# Patient Record
Sex: MALE | Race: WHITE | URBAN - NONMETROPOLITAN AREA
[De-identification: names, ages, dates, MRNs, and addresses within clinical notes are randomized per-mention and may not be internally consistent; named-entity substitution may affect disease eponyms.]

---

## 2018-08-22 ENCOUNTER — HOSPITAL ENCOUNTER (OUTPATIENT)
Dept: WOUND CARE | Age: 48
Discharge: HOME | End: 2018-08-22
Attending: NURSE PRACTITIONER | Admitting: NURSE PRACTITIONER

## 2018-08-22 VITALS
SYSTOLIC BLOOD PRESSURE: 131 MMHG | RESPIRATION RATE: 20 BRPM | DIASTOLIC BLOOD PRESSURE: 91 MMHG | TEMPERATURE: 97.2 F | HEART RATE: 98 BPM

## 2018-08-22 DIAGNOSIS — L97.519 ULCER OF TOE OF RIGHT FOOT, WITH UNSPECIFIED SEVERITY (HCC): ICD-10-CM

## 2018-08-22 DIAGNOSIS — I73.9 PERIPHERAL ARTERIAL DISEASE (HCC): ICD-10-CM

## 2018-08-22 DIAGNOSIS — L97.519: ICD-10-CM

## 2018-08-22 PROCEDURE — 99203 OFFICE O/P NEW LOW 30 MIN: CPT | Performed by: NURSE PRACTITIONER

## 2018-08-22 RX ORDER — CLINDAMYCIN HYDROCHLORIDE 150 MG/1
150 CAPSULE ORAL
COMMUNITY

## 2018-08-22 RX ORDER — HYDROCODONE BITARTRATE AND ACETAMINOPHEN 5; 325 MG/1; MG/1
1 TABLET ORAL
COMMUNITY
Start: 2018-07-25

## 2018-08-22 RX ORDER — IBUPROFEN 800 MG/1
1600 TABLET ORAL
COMMUNITY

## 2018-08-22 RX ORDER — CIPROFLOXACIN 500 MG/1
500 TABLET, FILM COATED ORAL
COMMUNITY

## 2018-08-22 RX ORDER — AMLODIPINE BESYLATE 5 MG/1
5 TABLET ORAL
COMMUNITY

## 2018-08-22 RX ORDER — HYDROCODONE BITARTRATE AND ACETAMINOPHEN 7.5; 325 MG/1; MG/1
1 TABLET ORAL EVERY 6 HOURS PRN
Qty: 20 TABLET | Refills: 0 | Status: SHIPPED | OUTPATIENT
Start: 2018-08-22 | End: 2018-08-22 | Stop reason: SDUPTHER

## 2018-08-22 RX ORDER — ATORVASTATIN CALCIUM 40 MG/1
40 TABLET, FILM COATED ORAL
COMMUNITY
Start: 2018-08-07 | End: 2019-08-07

## 2018-08-22 ASSESSMENT — PAIN SCALES - GENERAL: PAINLEVEL_OUTOF10: 8

## 2018-08-22 ASSESSMENT — PAIN DESCRIPTION - ONSET: ONSET: ON-GOING

## 2018-08-22 ASSESSMENT — PAIN DESCRIPTION - FREQUENCY: FREQUENCY: CONTINUOUS

## 2018-08-22 ASSESSMENT — PAIN DESCRIPTION - PROGRESSION: CLINICAL_PROGRESSION: GRADUALLY WORSENING

## 2018-08-22 ASSESSMENT — PAIN DESCRIPTION - ORIENTATION: ORIENTATION: RIGHT

## 2018-08-22 ASSESSMENT — PAIN DESCRIPTION - LOCATION: LOCATION: FOOT;TOE (COMMENT WHICH ONE)

## 2018-08-22 ASSESSMENT — PAIN DESCRIPTION - PAIN TYPE: TYPE: ACUTE PAIN

## 2018-08-22 ASSESSMENT — PAIN DESCRIPTION - DESCRIPTORS: DESCRIPTORS: BURNING;SHOOTING;STABBING

## 2018-08-22 NOTE — PROGRESS NOTES
Melba from [id number] called and states patient filled a script for Pilot this morning. Patient presents with another script for Norco this afternoon and she is not able to fill. Elisha Parada NP notified. Elisha Parada instructed for Melba to discard her norco script since patient will be seeing Dr [place] on Monday at wound clinic. Melba notified and script to be discarded. Melba to explain to patient that he can not fill both scripts due to insurance and law regulations.  Electronically signed by Iman Sexton RN on 8/22/2018 at 12:58 PM

## 2018-08-22 NOTE — PROGRESS NOTES
Hyperbaric oxygen history and physical        Bobby Hobbs  AGE: 57 y.o. GENDER: male  DOD: 1970  TODAY'S DATE:  8/22/2018    Subjective:      Bobby Hobbs is a 57 y.o. 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 The condition is of marked severity and is considered non healing and chronic. The wound has been present for 10 weeks. The inciting event was acute ischemic blockage. The patient is also receiving periodic debridement and dressing changes at [address] [place] [address]. He had revascularization procedure at [facility] 8/7/18. He continues to have necrotic tissue to the majority of right 4th and 5th toes. He continues on clindamycin and cipro oral antibiotics. He continues to have severe pain in the toes. HBO is being utilized for limb salvage at this point. The patient has significant underlying medical conditions as listed below in [address] [place] [address] Road    Past Medical History:    History reviewed. No pertinent past medical history. Medical History Pertinent to HBO:   Patient denies a history of amiodarone use or Bleomycin use with the last 12 months. No current chemotherapy use. No history of chronic sinusitis or upper respiratory infections, no reconstructive ear surgery or significant auditory impairment. No history of recent or uncontrolled seizure disorder. No history of significant cognitive impairment or psychiatric illnesses. No history of severe COPD with CO2 retention or bullous emphysema. No history of recent chest surgery. No history of pneumothorax. No history of pacemaker or AICD. No history of sudden CHF. No spherocytoses, sickle cell disease, or other hemoglobinopathy known. No history of optic neuritis or significant visual impairment. Past Surgical History:    History reviewed. No pertinent surgical history.       Medications Prior to Admission:    Prior to Admission medications Medication Sig Start Date End Date Taking? Authorizing Provider   atorvastatin (LIPITOR) 40 MG tablet Take 40 mg by mouth 8/7/18 8/7/19 Yes Historical Provider, MD   HYDROcodone-acetaminophen (NORCO) 5-325 MG per tablet Take 1 tablet by mouth. . 7/25/18  Yes Historical Provider, MD   HYDROcodone-acetaminophen (NORCO) 7.5-325 MG per tablet Take 1 tablet by mouth every 6 hours as needed for Pain for up to 5 days. Intended supply: 5 days. Take lowest dose possible to manage pain. 8/22/18 8/27/18 Yes DORIS De La Garza - CNP   amLODIPine (NORVASC) 5 MG tablet Take 5 mg by mouth    Historical Provider, MD   aspirin 81 MG tablet Take 81 mg by mouth    Historical Provider, MD   vitamin D (CHOLECALCIFEROL) 1000 units TABS tablet Take 1,000 Units by mouth    Historical Provider, MD   ciprofloxacin (CIPRO) 500 MG tablet Take 500 mg by mouth    Historical Provider, MD   clindamycin (CLEOCIN) 150 MG capsule Take 150 mg by mouth    Historical Provider, MD   ibuprofen (ADVIL;MOTRIN) 800 MG tablet Take 1,600 mg by mouth    Historical Provider, MD       Allergies:  Naproxen    Family history:  See information in Epic chart. Positive for none listed. Social History:   TOBACCO:   reports that he quit smoking about 2 weeks ago.  He has never used smokeless tobacco.  ETOH:   has no alcohol history on file.  home with a reliable caregiver  ROS:     REVIEW OF SYSTEMS    Constitutional: negative for chills, fatigue, fevers, malaise and sweats  Ears, nose, mouth, throat, and face: negative for ear drainage, earaches, facial trauma, hearing loss, hoarseness, nasal congestion, tinnitus and voice change  Respiratory: negative for chronic bronchitis, cough, dyspnea on exertion, emphysema and shortness of breath  Cardiovascular: negative for chest pain, chest pressure/discomfort, dyspnea, exertional chest pressure/discomfort and palpitations  Integument/breast: positive for skin lesion(s)  Neurological: negative for dizziness, headaches

## 2025-03-22 ENCOUNTER — HOSPITAL ENCOUNTER (EMERGENCY)
Age: 55
Discharge: HOME | End: 2025-03-22
Attending: EMERGENCY MEDICINE
Payer: OTHER GOVERNMENT

## 2025-03-22 ENCOUNTER — APPOINTMENT (OUTPATIENT)
Dept: ULTRASOUND IMAGING | Age: 55
End: 2025-03-22
Payer: OTHER GOVERNMENT

## 2025-03-22 VITALS
RESPIRATION RATE: 18 BRPM | OXYGEN SATURATION: 94 % | WEIGHT: 300 LBS | TEMPERATURE: 98.1 F | BODY MASS INDEX: 38.5 KG/M2 | HEART RATE: 86 BPM | DIASTOLIC BLOOD PRESSURE: 100 MMHG | SYSTOLIC BLOOD PRESSURE: 141 MMHG | HEIGHT: 74 IN

## 2025-03-22 DIAGNOSIS — N50.3 CYST OF EPIDIDYMIS: Primary | ICD-10-CM

## 2025-03-22 LAB
BILIRUB UR QL STRIP: NEGATIVE
CLARITY UR: CLEAR
COLOR UR: YELLOW
COMMENT: NORMAL
GLUCOSE UR STRIP-MCNC: NEGATIVE MG/DL
HGB UR QL STRIP.AUTO: NEGATIVE
KETONES UR STRIP-MCNC: NEGATIVE MG/DL
LEUKOCYTE ESTERASE UR QL STRIP: NEGATIVE
NITRITE UR QL STRIP: NEGATIVE
PH UR STRIP: 6 [PH] (ref 5–8)
PROT UR STRIP-MCNC: NEGATIVE MG/DL
SP GR UR STRIP: 1.02 (ref 1–1.03)
UROBILINOGEN UR STRIP-ACNC: 0.2 EU/DL (ref 0–1)

## 2025-03-22 PROCEDURE — 99284 EMERGENCY DEPT VISIT MOD MDM: CPT

## 2025-03-22 PROCEDURE — 76870 US EXAM SCROTUM: CPT

## 2025-03-22 PROCEDURE — 81003 URINALYSIS AUTO W/O SCOPE: CPT

## 2025-03-22 PROCEDURE — 93975 VASCULAR STUDY: CPT

## 2025-03-22 ASSESSMENT — PAIN DESCRIPTION - ORIENTATION: ORIENTATION: LEFT

## 2025-03-22 ASSESSMENT — PAIN - FUNCTIONAL ASSESSMENT: PAIN_FUNCTIONAL_ASSESSMENT: 0-10

## 2025-03-22 ASSESSMENT — PAIN SCALES - GENERAL: PAINLEVEL_OUTOF10: 2

## 2025-03-22 ASSESSMENT — PAIN DESCRIPTION - LOCATION: LOCATION: GROIN

## 2025-03-22 NOTE — ED PROVIDER NOTES
Triage Chief Complaint:   Groin Swelling (L sided sent from [facility] for US to R/O torsion )    [place]:  Bull Rothman is a 54 y.o. male that presents with concern for left testicular pain.  Patient was actually evaluated at outside hospital for new left testicle pain and swelling.  Patient was sent here for ultrasound to rule out torsion.  Patient does have history of the same.  Left testicle is with a new \"lump\" per the patient.  Patient describes it like a \"peanut shape\".  Patient does report isolated left-sided testicular pain that is mild.  No abdominal pain.  Patient has chronic back pain which is unchanged at this time.  Patient week ago had some pain with urinating but that since resolved.    ROS:  General:  No fevers, no chills  ENT: No sore throat, no runny nose  Cardiovascular:  No chest pain, no palpitations  Respiratory:  No shortness of breath, no cough  Gastrointestinal:  No pain, no nausea, no vomiting, no diarrhea  : No pain with urinating, no increased frequency urinating, + left testicle pain  Neurologic:  No numbness, no weakness  Extremities:  No edema, no pain  Skin:  No rash  Psych: No axienty    Past Medical History:   Diagnosis Date    Buerger disease 2018    Hyperlipidemia     Hypertension      Past Surgical History:   Procedure Laterality Date    FOOT SURGERY Left     cyst removal    KNEE SURGERY Left     ROTATOR CUFF REPAIR Bilateral     TOE AMPUTATION Right     4th and 5th     History reviewed. No pertinent family history.  Social History     Socioeconomic History    Marital status: Single     Spouse name: Not on file    Number of children: Not on file    Years of education: Not on file    Highest education level: Not on file   Occupational History    Not on file   Tobacco Use    Smoking status: Former     Current packs/day: 0.00     Types: Cigarettes     Quit date: 2018     Years since quittin.6    Smokeless tobacco: Never   Substance and Sexual Activity    Alcohol use: Not